# Patient Record
Sex: MALE | Race: OTHER | Employment: OTHER | URBAN - METROPOLITAN AREA
[De-identification: names, ages, dates, MRNs, and addresses within clinical notes are randomized per-mention and may not be internally consistent; named-entity substitution may affect disease eponyms.]

---

## 2021-07-12 ENCOUNTER — OFFICE VISIT (OUTPATIENT)
Dept: INTERNAL MEDICINE CLINIC | Facility: CLINIC | Age: 56
End: 2021-07-12
Payer: COMMERCIAL

## 2021-07-12 VITALS
SYSTOLIC BLOOD PRESSURE: 126 MMHG | DIASTOLIC BLOOD PRESSURE: 82 MMHG | BODY MASS INDEX: 26 KG/M2 | OXYGEN SATURATION: 97 % | HEART RATE: 62 BPM | WEIGHT: 182 LBS | RESPIRATION RATE: 16 BRPM

## 2021-07-12 DIAGNOSIS — R22.42 ANKLE MASS, LEFT: ICD-10-CM

## 2021-07-12 DIAGNOSIS — E11.9 CONTROLLED TYPE 2 DIABETES MELLITUS WITHOUT COMPLICATION, WITHOUT LONG-TERM CURRENT USE OF INSULIN (HCC): Primary | ICD-10-CM

## 2021-07-12 DIAGNOSIS — R79.89 ABNORMAL CBC: ICD-10-CM

## 2021-07-12 LAB
BASOPHILS # BLD AUTO: 0.05 X10(3) UL (ref 0–0.2)
BASOPHILS NFR BLD AUTO: 0.9 %
DEPRECATED RDW RBC AUTO: 41.6 FL (ref 35.1–46.3)
EOSINOPHIL # BLD AUTO: 0.72 X10(3) UL (ref 0–0.7)
EOSINOPHIL NFR BLD AUTO: 12.3 %
ERYTHROCYTE [DISTWIDTH] IN BLOOD BY AUTOMATED COUNT: 12.2 % (ref 11–15)
EST. AVERAGE GLUCOSE BLD GHB EST-MCNC: 148 MG/DL (ref 68–126)
HBA1C MFR BLD HPLC: 6.8 % (ref ?–5.7)
HCT VFR BLD AUTO: 45.7 %
HGB BLD-MCNC: 15.3 G/DL
IMM GRANULOCYTES # BLD AUTO: 0.04 X10(3) UL (ref 0–1)
IMM GRANULOCYTES NFR BLD: 0.7 %
LYMPHOCYTES # BLD AUTO: 1.47 X10(3) UL (ref 1–4)
LYMPHOCYTES NFR BLD AUTO: 25.1 %
MCH RBC QN AUTO: 31 PG (ref 26–34)
MCHC RBC AUTO-ENTMCNC: 33.5 G/DL (ref 31–37)
MCV RBC AUTO: 92.5 FL
MONOCYTES # BLD AUTO: 0.51 X10(3) UL (ref 0.1–1)
MONOCYTES NFR BLD AUTO: 8.7 %
NEUTROPHILS # BLD AUTO: 3.07 X10 (3) UL (ref 1.5–7.7)
NEUTROPHILS # BLD AUTO: 3.07 X10(3) UL (ref 1.5–7.7)
NEUTROPHILS NFR BLD AUTO: 52.3 %
PLATELET # BLD AUTO: 144 10(3)UL (ref 150–450)
RBC # BLD AUTO: 4.94 X10(6)UL
WBC # BLD AUTO: 5.9 X10(3) UL (ref 4–11)

## 2021-07-12 PROCEDURE — 99203 OFFICE O/P NEW LOW 30 MIN: CPT | Performed by: FAMILY MEDICINE

## 2021-07-12 PROCEDURE — 3074F SYST BP LT 130 MM HG: CPT | Performed by: FAMILY MEDICINE

## 2021-07-12 PROCEDURE — 83036 HEMOGLOBIN GLYCOSYLATED A1C: CPT | Performed by: FAMILY MEDICINE

## 2021-07-12 PROCEDURE — 3079F DIAST BP 80-89 MM HG: CPT | Performed by: FAMILY MEDICINE

## 2021-07-12 PROCEDURE — 85025 COMPLETE CBC W/AUTO DIFF WBC: CPT | Performed by: FAMILY MEDICINE

## 2021-07-12 RX ORDER — SIMVASTATIN 40 MG
40 TABLET ORAL EVERY EVENING
COMMUNITY
Start: 2021-06-16 | End: 2021-09-20

## 2021-07-12 NOTE — PROGRESS NOTES
PATIENT IDENTIFICATION  Name: Abiola Swanson  MRN: [id number]    Diagnoses:   Controlled type 2 diabetes mellitus without complication, without long-term current use of insulin ([facility]  (primary encounter diagnosis)  Abnormal CBC  Ankle mass, left    SUBJECTIVE Size: adult)   Pulse 62   Resp 16   Wt 182 lb (82.6 kg)   SpO2 97%   BMI 26.11 kg/m²   General Appearance: in no apparent distress and well developed and well nourished  HEENT: Normocephalic, atraumatic  Lungs: chest is clear without rales or wheezing  Hea

## 2021-07-23 ENCOUNTER — TELEPHONE (OUTPATIENT)
Dept: INTERNAL MEDICINE CLINIC | Facility: CLINIC | Age: 56
End: 2021-07-23

## 2021-07-23 DIAGNOSIS — D69.6 THROMBOCYTOPENIA (HCC): Primary | ICD-10-CM

## 2021-07-23 NOTE — TELEPHONE ENCOUNTER
Called and left message to call back. If patient calls back, please let him know his platelets were slightly low, this was also seen previously, I recommend he see a hematologist ( a blood doctor) to evaluate this further. Referral placed.      Diabetes

## 2021-08-23 ENCOUNTER — TELEPHONE (OUTPATIENT)
Dept: INTERNAL MEDICINE CLINIC | Facility: CLINIC | Age: 56
End: 2021-08-23

## 2021-08-25 DIAGNOSIS — R22.42 ANKLE MASS, LEFT: Primary | ICD-10-CM

## 2021-08-30 ENCOUNTER — TELEPHONE (OUTPATIENT)
Dept: INTERNAL MEDICINE CLINIC | Facility: CLINIC | Age: 56
End: 2021-08-30

## 2021-08-30 NOTE — TELEPHONE ENCOUNTER
Stormy Huynh DPM,  Podiatrist  [address] · [phone]    Only one with this phone number. Not in our network.

## 2021-08-30 NOTE — TELEPHONE ENCOUNTER
I cannot find a Dr. Sharon Ram that's a podiatrist with that number. Can RN help to see why referral is still pending?

## 2021-08-30 NOTE — TELEPHONE ENCOUNTER
So referral for  is still pending but patient called office and they stated they are not in network so patient would like a new referral for  located [facility] in [place] with phone number being [phone]. Please advise.

## 2021-09-20 RX ORDER — SIMVASTATIN 40 MG
40 TABLET ORAL EVERY EVENING
Qty: 90 TABLET | Refills: 0 | Status: SHIPPED | OUTPATIENT
Start: 2021-09-20

## 2021-09-20 RX ORDER — METFORMIN HYDROCHLORIDE 500 MG/1
500 TABLET, EXTENDED RELEASE ORAL
Qty: 90 TABLET | Refills: 0 | Status: SHIPPED | OUTPATIENT
Start: 2021-09-20

## 2021-10-12 ENCOUNTER — OFFICE VISIT (OUTPATIENT)
Dept: INTERNAL MEDICINE CLINIC | Facility: CLINIC | Age: 56
End: 2021-10-12
Payer: MEDICARE

## 2021-10-12 VITALS
RESPIRATION RATE: 15 BRPM | HEIGHT: 68 IN | HEART RATE: 75 BPM | BODY MASS INDEX: 26.67 KG/M2 | SYSTOLIC BLOOD PRESSURE: 108 MMHG | WEIGHT: 176 LBS | OXYGEN SATURATION: 99 % | DIASTOLIC BLOOD PRESSURE: 62 MMHG

## 2021-10-12 DIAGNOSIS — D69.6 THROMBOCYTOPENIA (HCC): ICD-10-CM

## 2021-10-12 DIAGNOSIS — Z23 NEED FOR INFLUENZA VACCINATION: ICD-10-CM

## 2021-10-12 DIAGNOSIS — R22.42 ANKLE MASS, LEFT: ICD-10-CM

## 2021-10-12 DIAGNOSIS — E11.9 CONTROLLED TYPE 2 DIABETES MELLITUS WITHOUT COMPLICATION, WITHOUT LONG-TERM CURRENT USE OF INSULIN (HCC): Primary | ICD-10-CM

## 2021-10-12 PROCEDURE — 90686 IIV4 VACC NO PRSV 0.5 ML IM: CPT | Performed by: FAMILY MEDICINE

## 2021-10-12 PROCEDURE — 99213 OFFICE O/P EST LOW 20 MIN: CPT | Performed by: FAMILY MEDICINE

## 2021-10-12 PROCEDURE — 3008F BODY MASS INDEX DOCD: CPT | Performed by: FAMILY MEDICINE

## 2021-10-12 PROCEDURE — G0008 ADMIN INFLUENZA VIRUS VAC: HCPCS | Performed by: FAMILY MEDICINE

## 2021-10-12 PROCEDURE — 3074F SYST BP LT 130 MM HG: CPT | Performed by: FAMILY MEDICINE

## 2021-10-12 PROCEDURE — 3078F DIAST BP <80 MM HG: CPT | Performed by: FAMILY MEDICINE

## 2021-10-12 PROCEDURE — 3044F HG A1C LEVEL LT 7.0%: CPT | Performed by: FAMILY MEDICINE

## 2021-10-15 NOTE — PROGRESS NOTES
PATIENT IDENTIFICATION  Name: Kimberly Brennan  MRN: [id number]    Diagnoses:   Controlled type 2 diabetes mellitus without complication, without long-term current use of insulin ([facility]  (primary encounter diagnosis)  Thrombocytopenia ([facility]  Need for influenza v perfused  Mood: No anxiety or depression noted      IMPRESSION/PLAN  Cleatis Portal presents to us today for f/u for diabetes, abnormal lab and needed referral.     1. Controlled type 2 diabetes mellitus without complication, without long-term current use of

## 2021-10-26 ENCOUNTER — TELEPHONE (OUTPATIENT)
Dept: INTERNAL MEDICINE CLINIC | Facility: CLINIC | Age: 56
End: 2021-10-26

## 2021-10-26 NOTE — TELEPHONE ENCOUNTER
Patient called   Needs a referral for this Doctor that is in his Network:    Dr. Umm Martinez  Address: [address] [facility] [address], [address] [place] [address], [address] [place] [address] Road Phone: [phone]

## 2021-10-27 NOTE — TELEPHONE ENCOUNTER
Pt will see Dr. Selma Blevins this Friday. Printed auth from insurance and original referral order.    Pt will  today to take to appointment.    -Dee Bermudez

## 2021-10-29 ENCOUNTER — OFFICE VISIT (OUTPATIENT)
Dept: PODIATRY CLINIC | Facility: CLINIC | Age: 56
End: 2021-10-29
Payer: MEDICARE

## 2021-10-29 ENCOUNTER — HOSPITAL ENCOUNTER (OUTPATIENT)
Dept: GENERAL RADIOLOGY | Age: 56
Discharge: HOME | End: 2021-10-29
Attending: PODIATRIST
Payer: MEDICARE

## 2021-10-29 DIAGNOSIS — M79.89 PALPABLE MASS OF SOFT TISSUE OF ANKLE: Primary | ICD-10-CM

## 2021-10-29 DIAGNOSIS — M79.89 PALPABLE MASS OF SOFT TISSUE OF ANKLE: ICD-10-CM

## 2021-10-29 PROCEDURE — 73610 X-RAY EXAM OF ANKLE: CPT | Performed by: PODIATRIST

## 2021-10-29 PROCEDURE — 99203 OFFICE O/P NEW LOW 30 MIN: CPT | Performed by: PODIATRIST

## 2021-10-29 NOTE — PROGRESS NOTES
Christophe Amezcua is a 61year old male. Patient presents with:  New Patient: Left foot lump, pt states is been itching denies any pain today. A1c was 6.9 10/12/21 pt did not check BS this Am.         HPI:   This pleasant gentleman presents to the clinic with a SYSTEMS:   Today reviewed systens as documented below  GENERAL HEALTH: feels well otherwise  SKIN: Refer to exam below  RESPIRATORY: denies shortness of breath with exertion  CARDIOVASCULAR: denies chest pain on exertion  GI: denies abdominal pain and hollie

## 2021-11-01 ENCOUNTER — TELEPHONE (OUTPATIENT)
Dept: ENDOCRINOLOGY CLINIC | Facility: CLINIC | Age: 56
End: 2021-11-01

## 2021-11-01 NOTE — TELEPHONE ENCOUNTER
Patient does not have a my chart account.    Please contact patient and notify that MRI approved to be done at [facility]   [address] [place], [address]  Scheduling [phone] , fax [phone], Gema Man from 11/1/2021

## 2021-11-03 NOTE — TELEPHONE ENCOUNTER
Called pt and he states he speaks very little english and I told him I could CB with  but he requested I s/w his son. Informed son of approval info, exp date and facility info to call and schedule.  Advised him to obtain a disc with MRI images an

## 2021-11-11 NOTE — TELEPHONE ENCOUNTER
Patient states he was trying to scheduled his MRI but they told him Dr. Selma Blevins has to call. States he is unsure to why since his MRI is authorized.  Phone [phone]

## 2021-11-11 NOTE — TELEPHONE ENCOUNTER
Spoke to patient, states attempted to schedule MRI but was informed by Bart Toscano that office has to call states he tried to provided Auth number but office continued requesting office to contact them.        Prior [address] from [address]

## 2021-11-12 ENCOUNTER — APPOINTMENT (OUTPATIENT)
Dept: HEMATOLOGY/ONCOLOGY | Facility: HOSPITAL | Age: 56
End: 2021-11-12
Attending: INTERNAL MEDICINE
Payer: MEDICARE

## 2021-11-12 VITALS
RESPIRATION RATE: 16 BRPM | OXYGEN SATURATION: 97 % | HEIGHT: 69 IN | WEIGHT: 178 LBS | DIASTOLIC BLOOD PRESSURE: 73 MMHG | TEMPERATURE: 98 F | BODY MASS INDEX: 26.36 KG/M2 | HEART RATE: 78 BPM | SYSTOLIC BLOOD PRESSURE: 123 MMHG

## 2021-11-12 DIAGNOSIS — R16.1 SPLEEN ENLARGED: ICD-10-CM

## 2021-11-12 DIAGNOSIS — D69.6 THROMBOCYTOPENIA (HCC): Primary | ICD-10-CM

## 2021-11-12 PROCEDURE — 99204 OFFICE O/P NEW MOD 45 MIN: CPT | Performed by: INTERNAL MEDICINE

## 2021-11-12 NOTE — TELEPHONE ENCOUNTER
[facility], [phone], spoke with Amaris Catalan. Informed need to change facility to [facility] at [address], [address]  Fax [phone], she placed me on hold to verify facility in network and has updated ca

## 2021-11-12 NOTE — CONSULTS
ELIAZAR June is a 61year old male here at the request of Jaymie Mancia MD for evaluation of Thrombocytopenia (hcc)  (primary encounter diagnosis)    Patient presents here today for evaluation of mild thrombocytopenia.   This was first noted March 6 per week    Drug use: No      Family History   Problem Relation Age of Onset   • Cancer Father 76        Renal    • Diabetes Mother          PHYSICAL EXAM:    /73 (BP Location: Left arm, Patient Position: Sitting, Cuff Size: adult)   Pulse 78   Temp encounter.       Specimen:  Blood - Venous  Component   Ref Range & Units 8 mo ago   WBC   3.6 - 10.2 10ˆ3/µL 6.0       RBC   (Based on documented legal sex) 4.40-6.00 10ˆ6/µL 5.00       HGB   (Based on documented legal sex) 13.2-18.0 g/dL 15.4       HCT 31.0 - 37.0 g/dL 33.5    RDW-SD   35.1 - 46.3 fL 41.6    RDW   11.0 - 15.0 % 12.2    PLT   150.0 - 450.0 10(3)uL 144.0 Low     Neutrophil Absolute Prelim   1.50 - 7.70 x10 (3) uL 3.07    Neutrophil Absolute   1.50 - 7.70 x10(3) uL 3.07    Lymphocyte Abso

## 2021-11-12 NOTE — PATIENT INSTRUCTIONS
Plaquetas  ¿Rick análisis tiene otros nombres? Recuento de plaquetas, recuento de trombocitos. ¿Qué es rick análisis? Rick análisis mide la cantidad de plaquetas que hay en bennett vale.   Las plaquetas son células con forma de disco que ayudan a que bennett s rick?  También es posible que bennett proveedor de atención médica le pida un hemograma completo (CBC, por fabrice siglas en inglés), que mide todas las células de [address]   Bennett proveedor de [employer] puede pedirle un volumen plaquetario medio (MPV, para extraer vale de harrison vena en bennett brazo o mano.   ¿Carmelina análisis implica algún riesgo? Cody harrison Elmo de vale con harrison aguja implica riesgos. Estos incluyen sangrado, infección, moretones o sensación de [place].  Cuando le pinchen el brazo o la [place] trombocitopenia  Las plaquetas y otras células sanguíneas se producen en la médula ósea. La médula ósea es la parte blanda y esponjosa del interior de [place].  La trombocitopenia puede presentarse si sucede lo siguiente:   · La médula ósea no produce bennett de células sanguíneas. Se extiende Cristal Cords de vale sobre un portamuestras de lizzeth y se examina con el microscopio. Se [place] un tinte para poder bryce las células. · Aspiración y biopsia de la [place].  Esta prueba sirve para detectar problemas en l trastorno es crónico, podría requerir tratamientos específicos para controlarlo. También podría ser necesario que tome ciertas precauciones todos los días para reducir el riesgo de sangrado.  Por ejemplo, walt vez le indiquen que limite las actividades que a a detener el sangrado (hemorragia). Cuando bennett cuerpo no tiene suficientes plaquetas, aumenta bennett riesgo de sangrado. Los siguientes son consejos de las medidas que usted puede jennifer en bennett casa para bajar bennett riesgo.   Medicamentos y atención médica  · Evite t piel.  · Si tiene problemas de sangrado de encías, use un cepillo de dientes de esponja (en vez de kaylyn de cerdas). Pregunte a bennett proveedor de [employer] o dentista dónde puede conseguir kaylyn.   Visitas de control  Programe harrison visita de control según le

## 2021-11-18 NOTE — TELEPHONE ENCOUNTER
Patient was notified with understanding. Phone number provided for scheduling. Order faxed and confirmation received.

## 2021-12-07 ENCOUNTER — HOSPITAL ENCOUNTER (OUTPATIENT)
Dept: ULTRASOUND IMAGING | Facility: HOSPITAL | Age: 56
Discharge: HOME | End: 2021-12-07
Attending: INTERNAL MEDICINE
Payer: MEDICARE

## 2021-12-07 DIAGNOSIS — R16.1 SPLEEN ENLARGED: ICD-10-CM

## 2021-12-07 PROCEDURE — 76705 ECHO EXAM OF ABDOMEN: CPT | Performed by: INTERNAL MEDICINE

## 2022-01-18 ENCOUNTER — OFFICE VISIT (OUTPATIENT)
Dept: INTERNAL MEDICINE CLINIC | Facility: CLINIC | Age: 57
End: 2022-01-18
Payer: MEDICARE

## 2022-01-18 VITALS
WEIGHT: 181 LBS | HEIGHT: 69 IN | BODY MASS INDEX: 26.81 KG/M2 | DIASTOLIC BLOOD PRESSURE: 68 MMHG | OXYGEN SATURATION: 99 % | SYSTOLIC BLOOD PRESSURE: 122 MMHG | HEART RATE: 67 BPM | RESPIRATION RATE: 16 BRPM

## 2022-01-18 DIAGNOSIS — E78.2 MIXED HYPERLIPIDEMIA: ICD-10-CM

## 2022-01-18 DIAGNOSIS — E11.9 CONTROLLED TYPE 2 DIABETES MELLITUS WITHOUT COMPLICATION, WITHOUT LONG-TERM CURRENT USE OF INSULIN (HCC): ICD-10-CM

## 2022-01-18 DIAGNOSIS — D69.6 THROMBOCYTOPENIA (HCC): ICD-10-CM

## 2022-01-18 DIAGNOSIS — M89.8X9 BONE PAIN: ICD-10-CM

## 2022-01-18 DIAGNOSIS — Z00.00 ENCOUNTER FOR ANNUAL HEALTH EXAMINATION: Primary | ICD-10-CM

## 2022-01-18 DIAGNOSIS — G89.29 HIP PAIN, CHRONIC, LEFT: ICD-10-CM

## 2022-01-18 DIAGNOSIS — M25.552 HIP PAIN, CHRONIC, LEFT: ICD-10-CM

## 2022-01-18 DIAGNOSIS — Z12.5 SCREENING FOR PROSTATE CANCER: ICD-10-CM

## 2022-01-18 PROCEDURE — 96160 PT-FOCUSED HLTH RISK ASSMT: CPT | Performed by: FAMILY MEDICINE

## 2022-01-18 PROCEDURE — 3008F BODY MASS INDEX DOCD: CPT | Performed by: FAMILY MEDICINE

## 2022-01-18 PROCEDURE — 3061F NEG MICROALBUMINURIA REV: CPT | Performed by: FAMILY MEDICINE

## 2022-01-18 PROCEDURE — 3074F SYST BP LT 130 MM HG: CPT | Performed by: FAMILY MEDICINE

## 2022-01-18 PROCEDURE — G0438 PPPS, INITIAL VISIT: HCPCS | Performed by: FAMILY MEDICINE

## 2022-01-18 PROCEDURE — 3052F HG A1C>EQUAL 8.0%<EQUAL 9.0%: CPT | Performed by: FAMILY MEDICINE

## 2022-01-18 PROCEDURE — 99396 PREV VISIT EST AGE 40-64: CPT | Performed by: FAMILY MEDICINE

## 2022-01-18 PROCEDURE — 3078F DIAST BP <80 MM HG: CPT | Performed by: FAMILY MEDICINE

## 2022-01-18 NOTE — PATIENT INSTRUCTIONS
Mau Elizabeth's SCREENING SCHEDULE   Tests on this list are recommended by your physician but may not be covered, or covered at this frequency, by your insurer. Please check with your insurance carrier before scheduling to verify coverage.    PREVENTATIVE Hepatitis B One screening covered for patients with certain risk factors   04/12/2019  No recommendations at this time    Tetanus Toxoid Not covered by Medicare Part B unless medically necessary (cut with metal); may be covered with your pharmacy prescr

## 2022-01-18 NOTE — PROGRESS NOTES
Subjective:   Claudius Cheadle is a 61year old male who presents for a MA (Medicare Advantage) Supervisit (Once per calendar year). For the last 6 months gets pain in his lateral hip that hurts up to   5/10, radiates to mid lateral thigh.    Feels like a \" History:  He  has a past surgical history that includes shoulder arthroscopy (3/27/13) and arthroscopy, shoulder, surgical; w/rotator cuff repair (Right, 11/19/2014).    Family History:  His family history includes Cancer (age of onset: 76) in his father; D apparent distress and well   developed and well nourished  HEENT: Normocephalic, atraumatic, normal TMs bilaterally,(posterior oropharynx not examined due to current pandemic and risk of COVID-19)   Lungs: chest is clear without rales or wheezing  Heart: S the plan. Reinforced healthy diet, lifestyle, and exercise. No follow-ups on file. Mayra Wang MD, 1/18/2022     Supplementary Documentation:   General Health:   In the past six months, have you lost more than 10 pounds without trying?: 2 - No  Dulce Venegas for Abdominal Aortic Aneurysm (AAA) Covered once in a lifetime for one of the following risk factors   • Men who are 73-68 years old and have ever smoked   • Anyone with a family history -     Colorectal Cancer Screening  Covered for ages 52-80; only need

## 2022-01-19 LAB
CREATININE, RANDOM URINE: 78 MG/DL (ref 20–320)
MICROALBUMIN/CREATININE RATIO, RANDOM URINE: 3 MCG/MG CREAT
MICROALBUMIN: 0.2 MG/DL

## 2022-01-20 ENCOUNTER — NURSE ONLY (OUTPATIENT)
Dept: INTERNAL MEDICINE CLINIC | Facility: CLINIC | Age: 57
End: 2022-01-20
Payer: MEDICARE

## 2022-01-21 LAB
HEMOGLOBIN A1C: 8.1 % OF TOTAL HGB
VITAMIN D, 25-OH, TOTAL: 19 NG/ML (ref 30–100)

## 2022-01-24 LAB
ALBUMIN/GLOBULIN RATIO: 1.7 (CALC) (ref 1–2.5)
ALBUMIN: 4.5 G/DL (ref 3.6–5.1)
ALKALINE PHOSPHATASE: 84 U/L (ref 35–144)
ALT: 109 U/L (ref 9–46)
AST: 153 U/L (ref 10–35)
BILIRUBIN, TOTAL: 0.9 MG/DL (ref 0.2–1.2)
BUN: 11 MG/DL (ref 7–25)
CALCIUM: 9.4 MG/DL (ref 8.6–10.3)
CARBON DIOXIDE: 23 MMOL/L (ref 20–32)
CHLORIDE: 102 MMOL/L (ref 98–110)
CHOL/HDLC RATIO: 4.6 (CALC)
CHOLESTEROL, TOTAL: 188 MG/DL
CREATININE: 0.89 MG/DL (ref 0.7–1.33)
EGFR IF AFRICN AM: 110 ML/MIN/1.73M2
EGFR IF NONAFRICN AM: 95 ML/MIN/1.73M2
GLOBULIN: 2.6 G/DL (CALC) (ref 1.9–3.7)
GLUCOSE: 157 MG/DL (ref 65–99)
HDL CHOLESTEROL: 41 MG/DL
LDL-CHOLESTEROL: 113 MG/DL (CALC)
NON-HDL CHOLESTEROL: 147 MG/DL (CALC)
POTASSIUM: 4.3 MMOL/L (ref 3.5–5.3)
PROTEIN, TOTAL: 7.1 G/DL (ref 6.1–8.1)
SODIUM: 136 MMOL/L (ref 135–146)
TRIGLYCERIDES: 223 MG/DL

## 2022-02-09 ENCOUNTER — OFFICE VISIT (OUTPATIENT)
Dept: INTERNAL MEDICINE CLINIC | Facility: CLINIC | Age: 57
End: 2022-02-09
Payer: MEDICARE

## 2022-02-09 VITALS
OXYGEN SATURATION: 98 % | HEIGHT: 69 IN | DIASTOLIC BLOOD PRESSURE: 78 MMHG | WEIGHT: 186 LBS | HEART RATE: 84 BPM | SYSTOLIC BLOOD PRESSURE: 124 MMHG | BODY MASS INDEX: 27.55 KG/M2

## 2022-02-09 DIAGNOSIS — R79.89 ELEVATED LFTS: Primary | ICD-10-CM

## 2022-02-09 DIAGNOSIS — E11.65 CONTROLLED TYPE 2 DIABETES MELLITUS WITH HYPERGLYCEMIA, WITHOUT LONG-TERM CURRENT USE OF INSULIN (HCC): ICD-10-CM

## 2022-02-09 PROCEDURE — 36415 COLL VENOUS BLD VENIPUNCTURE: CPT | Performed by: FAMILY MEDICINE

## 2022-02-09 PROCEDURE — 99214 OFFICE O/P EST MOD 30 MIN: CPT | Performed by: FAMILY MEDICINE

## 2022-02-09 PROCEDURE — 3078F DIAST BP <80 MM HG: CPT | Performed by: FAMILY MEDICINE

## 2022-02-09 PROCEDURE — 3008F BODY MASS INDEX DOCD: CPT | Performed by: FAMILY MEDICINE

## 2022-02-09 PROCEDURE — 3074F SYST BP LT 130 MM HG: CPT | Performed by: FAMILY MEDICINE

## 2022-02-10 LAB
% SATURATION: 31 % (CALC) (ref 20–48)
IRON BINDING CAPACITY: 288 MCG/DL (CALC) (ref 250–425)
IRON, TOTAL: 89 MCG/DL (ref 50–180)
SIGNAL TO CUT-OFF: 0.02
TSH W/REFLEX TO FT4: 4.12 MIU/L (ref 0.4–4.5)

## 2022-02-15 RX ORDER — SIMVASTATIN 40 MG
40 TABLET ORAL EVERY EVENING
Qty: 90 TABLET | Refills: 0 | Status: SHIPPED | OUTPATIENT
Start: 2022-02-15

## 2022-03-04 ENCOUNTER — TELEPHONE (OUTPATIENT)
Dept: INTERNAL MEDICINE CLINIC | Facility: CLINIC | Age: 57
End: 2022-03-04

## 2022-03-04 NOTE — TELEPHONE ENCOUNTER
Referral pending since late January. Patient neds to wait for us to give him the OK to schedule. Asking PSR to please call him with that info.

## 2022-03-04 NOTE — TELEPHONE ENCOUNTER
Pt called and went to see Dr. Krishna Simpler - eye  and they advised pt that he needed a referral.  Please call pt.

## 2022-03-04 NOTE — TELEPHONE ENCOUNTER
Referral has been approved. Must see eye doctor no later than 06-. Referral was updated. Thank you! Asking Vietnamese Speaking staff to call and tell patient. RN will fax referral to speciality office.

## 2022-04-11 ENCOUNTER — LAB ENCOUNTER (OUTPATIENT)
Dept: LAB | Facility: HOSPITAL | Age: 57
End: 2022-04-11
Attending: INTERNAL MEDICINE
Payer: MEDICARE

## 2022-04-11 ENCOUNTER — OFFICE VISIT (OUTPATIENT)
Dept: SURGERY | Facility: CLINIC | Age: 57
End: 2022-04-11
Payer: MEDICARE

## 2022-04-11 VITALS
HEART RATE: 89 BPM | TEMPERATURE: 96 F | WEIGHT: 180.81 LBS | OXYGEN SATURATION: 95 % | SYSTOLIC BLOOD PRESSURE: 115 MMHG | BODY MASS INDEX: 27 KG/M2 | DIASTOLIC BLOOD PRESSURE: 69 MMHG

## 2022-04-11 DIAGNOSIS — R79.89 ELEVATED LIVER FUNCTION TESTS: Primary | ICD-10-CM

## 2022-04-11 DIAGNOSIS — R79.89 ELEVATED LIVER FUNCTION TESTS: ICD-10-CM

## 2022-04-11 LAB
ALBUMIN SERPL-MCNC: 4.1 G/DL (ref 3.4–5)
ALP LIVER SERPL-CCNC: 72 U/L
ALT SERPL-CCNC: 69 U/L
AST SERPL-CCNC: 49 U/L (ref 15–37)
BILIRUB DIRECT SERPL-MCNC: 0.2 MG/DL (ref 0–0.2)
BILIRUB SERPL-MCNC: 0.6 MG/DL (ref 0.1–2)
CERULOPLASMIN SERPL-MCNC: 22.3 MG/DL (ref 20–60)
CK SERPL-CCNC: 85 U/L
HAV AB SER QL IA: REACTIVE
HBV SURFACE AB SER QL: REACTIVE
HBV SURFACE AB SERPL IA-ACNC: >1000 MIU/ML
IMMUNOGLOBULIN PNL SER-MCNC: 990 MG/DL (ref 791–1643)
PROT SERPL-MCNC: 7.1 G/DL (ref 6.4–8.2)

## 2022-04-11 PROCEDURE — 86706 HEP B SURFACE ANTIBODY: CPT | Performed by: INTERNAL MEDICINE

## 2022-04-11 PROCEDURE — 86709 HEPATITIS A IGM ANTIBODY: CPT | Performed by: INTERNAL MEDICINE

## 2022-04-11 PROCEDURE — 36415 COLL VENOUS BLD VENIPUNCTURE: CPT | Performed by: INTERNAL MEDICINE

## 2022-04-11 PROCEDURE — 86708 HEPATITIS A ANTIBODY: CPT | Performed by: INTERNAL MEDICINE

## 2022-04-11 PROCEDURE — 80076 HEPATIC FUNCTION PANEL: CPT | Performed by: INTERNAL MEDICINE

## 2022-04-11 PROCEDURE — 82550 ASSAY OF CK (CPK): CPT | Performed by: INTERNAL MEDICINE

## 2022-04-11 PROCEDURE — 86381 MITOCHONDRIAL ANTIBODY EACH: CPT | Performed by: INTERNAL MEDICINE

## 2022-04-11 PROCEDURE — 82390 ASSAY OF CERULOPLASMIN: CPT | Performed by: INTERNAL MEDICINE

## 2022-04-11 PROCEDURE — 86038 ANTINUCLEAR ANTIBODIES: CPT

## 2022-04-11 PROCEDURE — 82784 ASSAY IGA/IGD/IGG/IGM EACH: CPT | Performed by: INTERNAL MEDICINE

## 2022-04-11 PROCEDURE — 86256 FLUORESCENT ANTIBODY TITER: CPT | Performed by: INTERNAL MEDICINE

## 2022-04-12 LAB
HAV IGM SER QL: NONREACTIVE
MITOCHONDRIA AB TITR SER: <20 {TITER}
SMOOTH MUSCLE AB TITR SER: <20 {TITER}

## 2022-04-14 LAB — NUCLEAR IGG TITR SER IF: NEGATIVE {TITER}

## 2022-04-20 RX ORDER — SIMVASTATIN 40 MG
40 TABLET ORAL EVERY EVENING
Qty: 90 TABLET | Refills: 2 | Status: SHIPPED | OUTPATIENT
Start: 2022-04-20

## 2022-04-20 NOTE — TELEPHONE ENCOUNTER
Given refill. Please call patient and advise he is due for repeat fasting lipid panel to ensure this medication has him at his goal and also a1c repeat. Thanks.    Seamus Armas MD

## 2022-05-18 ENCOUNTER — LAB ENCOUNTER (OUTPATIENT)
Dept: LAB | Facility: HOSPITAL | Age: 57
End: 2022-05-18
Attending: INTERNAL MEDICINE
Payer: MEDICARE

## 2022-05-18 DIAGNOSIS — D69.6 THROMBOCYTOPENIA (HCC): ICD-10-CM

## 2022-05-18 LAB
BASOPHILS # BLD AUTO: 0.05 X10(3) UL (ref 0–0.2)
BASOPHILS NFR BLD AUTO: 0.7 %
DEPRECATED RDW RBC AUTO: 39.6 FL (ref 35.1–46.3)
EOSINOPHIL # BLD AUTO: 0.97 X10(3) UL (ref 0–0.7)
EOSINOPHIL NFR BLD AUTO: 13.4 %
ERYTHROCYTE [DISTWIDTH] IN BLOOD BY AUTOMATED COUNT: 11.9 % (ref 11–15)
HCT VFR BLD AUTO: 47.8 %
HGB BLD-MCNC: 16.2 G/DL
IMM GRANULOCYTES # BLD AUTO: 0.02 X10(3) UL (ref 0–1)
IMM GRANULOCYTES NFR BLD: 0.3 %
LYMPHOCYTES # BLD AUTO: 2.14 X10(3) UL (ref 1–4)
LYMPHOCYTES NFR BLD AUTO: 29.5 %
MCH RBC QN AUTO: 30.5 PG (ref 26–34)
MCHC RBC AUTO-ENTMCNC: 33.9 G/DL (ref 31–37)
MCV RBC AUTO: 90 FL
MONOCYTES # BLD AUTO: 0.66 X10(3) UL (ref 0.1–1)
MONOCYTES NFR BLD AUTO: 9.1 %
NEUTROPHILS # BLD AUTO: 3.42 X10 (3) UL (ref 1.5–7.7)
NEUTROPHILS # BLD AUTO: 3.42 X10(3) UL (ref 1.5–7.7)
NEUTROPHILS NFR BLD AUTO: 47 %
PLATELET # BLD AUTO: 159 10(3)UL (ref 150–450)
RBC # BLD AUTO: 5.31 X10(6)UL
WBC # BLD AUTO: 7.3 X10(3) UL (ref 4–11)

## 2022-05-18 PROCEDURE — 85025 COMPLETE CBC W/AUTO DIFF WBC: CPT

## 2022-05-18 PROCEDURE — 36415 COLL VENOUS BLD VENIPUNCTURE: CPT

## 2022-05-19 ENCOUNTER — OFFICE VISIT (OUTPATIENT)
Dept: HEMATOLOGY/ONCOLOGY | Facility: HOSPITAL | Age: 57
End: 2022-05-19
Attending: INTERNAL MEDICINE
Payer: MEDICARE

## 2022-05-19 VITALS
BODY MASS INDEX: 26.07 KG/M2 | HEART RATE: 68 BPM | DIASTOLIC BLOOD PRESSURE: 80 MMHG | TEMPERATURE: 98 F | OXYGEN SATURATION: 96 % | WEIGHT: 176 LBS | RESPIRATION RATE: 16 BRPM | SYSTOLIC BLOOD PRESSURE: 117 MMHG | HEIGHT: 69 IN

## 2022-05-19 DIAGNOSIS — D69.6 THROMBOCYTOPENIA (HCC): Primary | ICD-10-CM

## 2022-05-19 PROCEDURE — 99213 OFFICE O/P EST LOW 20 MIN: CPT | Performed by: INTERNAL MEDICINE

## 2022-06-13 ENCOUNTER — LAB ENCOUNTER (OUTPATIENT)
Dept: LAB | Facility: HOSPITAL | Age: 57
End: 2022-06-13
Attending: INTERNAL MEDICINE
Payer: MEDICARE

## 2022-06-13 ENCOUNTER — OFFICE VISIT (OUTPATIENT)
Dept: SURGERY | Facility: CLINIC | Age: 57
End: 2022-06-13
Payer: MEDICARE

## 2022-06-13 VITALS
BODY MASS INDEX: 26 KG/M2 | SYSTOLIC BLOOD PRESSURE: 102 MMHG | DIASTOLIC BLOOD PRESSURE: 64 MMHG | OXYGEN SATURATION: 95 % | TEMPERATURE: 98 F | HEART RATE: 74 BPM | WEIGHT: 179.19 LBS

## 2022-06-13 DIAGNOSIS — R79.89 ELEVATED LIVER FUNCTION TESTS: Primary | ICD-10-CM

## 2022-06-13 DIAGNOSIS — R79.89 ELEVATED LIVER FUNCTION TESTS: ICD-10-CM

## 2022-06-13 LAB
ALBUMIN SERPL-MCNC: 3.9 G/DL (ref 3.4–5)
ALP LIVER SERPL-CCNC: 64 U/L
ALT SERPL-CCNC: 56 U/L
AST SERPL-CCNC: 48 U/L (ref 15–37)
BILIRUB DIRECT SERPL-MCNC: <0.1 MG/DL (ref 0–0.2)
BILIRUB SERPL-MCNC: 0.5 MG/DL (ref 0.1–2)
PROT SERPL-MCNC: 7.1 G/DL (ref 6.4–8.2)

## 2022-06-13 PROCEDURE — 80076 HEPATIC FUNCTION PANEL: CPT | Performed by: INTERNAL MEDICINE

## 2022-06-13 PROCEDURE — 36415 COLL VENOUS BLD VENIPUNCTURE: CPT

## 2022-06-13 PROCEDURE — 80321 ALCOHOLS BIOMARKERS 1OR 2: CPT

## 2022-06-16 LAB
PETH 16:0/18:1 (POPETH): 391 NG/ML
PETH 16:0/18:2 (PLPETH): 281 NG/ML

## 2022-07-18 ENCOUNTER — HOSPITAL ENCOUNTER (OUTPATIENT)
Dept: ULTRASOUND IMAGING | Facility: HOSPITAL | Age: 57
Discharge: HOME | End: 2022-07-18
Attending: INTERNAL MEDICINE
Payer: MEDICARE

## 2022-07-18 DIAGNOSIS — R79.89 ELEVATED LIVER FUNCTION TESTS: ICD-10-CM

## 2022-07-18 PROCEDURE — 76705 ECHO EXAM OF ABDOMEN: CPT | Performed by: INTERNAL MEDICINE

## 2022-07-18 PROCEDURE — 76981 USE PARENCHYMA: CPT | Performed by: INTERNAL MEDICINE

## 2022-08-30 ENCOUNTER — OFFICE VISIT (OUTPATIENT)
Dept: FAMILY MEDICINE CLINIC | Facility: CLINIC | Age: 57
End: 2022-08-30
Payer: MEDICARE

## 2022-08-30 VITALS
HEIGHT: 69 IN | BODY MASS INDEX: 26.36 KG/M2 | SYSTOLIC BLOOD PRESSURE: 137 MMHG | DIASTOLIC BLOOD PRESSURE: 85 MMHG | TEMPERATURE: 98 F | WEIGHT: 178 LBS

## 2022-08-30 DIAGNOSIS — R79.89 ELEVATED LFTS: ICD-10-CM

## 2022-08-30 DIAGNOSIS — E78.00 HYPERCHOLESTEROLEMIA: ICD-10-CM

## 2022-08-30 DIAGNOSIS — E11.65 TYPE 2 DIABETES MELLITUS WITH HYPERGLYCEMIA, WITHOUT LONG-TERM CURRENT USE OF INSULIN (HCC): Primary | ICD-10-CM

## 2022-08-30 DIAGNOSIS — D69.6 THROMBOCYTOPENIA (HCC): ICD-10-CM

## 2022-08-30 LAB
CARTRIDGE LOT#: ABNORMAL NUMERIC
HEMOGLOBIN A1C: 6.2 % (ref 4.3–5.6)

## 2022-08-30 PROCEDURE — 99203 OFFICE O/P NEW LOW 30 MIN: CPT | Performed by: FAMILY MEDICINE

## 2022-08-30 PROCEDURE — 3079F DIAST BP 80-89 MM HG: CPT | Performed by: FAMILY MEDICINE

## 2022-08-30 PROCEDURE — 3008F BODY MASS INDEX DOCD: CPT | Performed by: FAMILY MEDICINE

## 2022-08-30 PROCEDURE — 3044F HG A1C LEVEL LT 7.0%: CPT | Performed by: FAMILY MEDICINE

## 2022-08-30 PROCEDURE — 3075F SYST BP GE 130 - 139MM HG: CPT | Performed by: FAMILY MEDICINE

## 2022-08-30 PROCEDURE — 83036 HEMOGLOBIN GLYCOSYLATED A1C: CPT | Performed by: FAMILY MEDICINE

## 2022-10-10 ENCOUNTER — TELEPHONE (OUTPATIENT)
Dept: FAMILY MEDICINE CLINIC | Facility: CLINIC | Age: 57
End: 2022-10-10

## 2022-10-10 DIAGNOSIS — E11.65 TYPE 2 DIABETES MELLITUS WITH HYPERGLYCEMIA, WITHOUT LONG-TERM CURRENT USE OF INSULIN (HCC): Primary | ICD-10-CM

## 2022-10-10 NOTE — TELEPHONE ENCOUNTER
Attempted to reach patient via [employer]   ID# [id number] for medication adherence consult. Unable to reach patient; LVM for patient to call me back.

## 2022-10-11 NOTE — TELEPHONE ENCOUNTER
Spoke to patient via [address] [place] [address]  ID# [id number] for medication adherence consult. Patient overdue for refill on sitagliptin per insurance report. Patient reports that he has been using the sitagliptin more on a PRN basis because the medication is too costly for him. Patient reports that if his sugar is elevated in the morning, he will take the medication but otherwise will skip the medication. Provided education on the importance of consistency with medication and offered to reach out to PCP for alternative therapy. Patient would like for me to notify his PCP and check if he could be on a more affordable medication. Will send message to PCP to recommend alternative medication. Patient denies any additional questions or concerns at this time.

## 2022-10-13 RX ORDER — PIOGLITAZONEHYDROCHLORIDE 30 MG/1
30 TABLET ORAL DAILY
Qty: 90 TABLET | Refills: 1 | Status: SHIPPED | OUTPATIENT
Start: 2022-10-13

## 2022-11-08 ENCOUNTER — TELEPHONE (OUTPATIENT)
Dept: SURGERY | Facility: HOSPITAL | Age: 57
End: 2022-11-08

## 2022-11-08 ENCOUNTER — LAB ENCOUNTER (OUTPATIENT)
Dept: LAB | Facility: HOSPITAL | Age: 57
End: 2022-11-08
Attending: INTERNAL MEDICINE
Payer: MEDICARE

## 2022-11-08 DIAGNOSIS — R79.89 ABNORMAL LIVER FUNCTION TESTS: ICD-10-CM

## 2022-11-08 DIAGNOSIS — R79.89 ABNORMAL LIVER FUNCTION TESTS: Primary | ICD-10-CM

## 2022-11-08 LAB
ALBUMIN SERPL-MCNC: 4.1 G/DL (ref 3.4–5)
ALP LIVER SERPL-CCNC: 85 U/L
ALT SERPL-CCNC: 78 U/L
AST SERPL-CCNC: 56 U/L (ref 15–37)
BILIRUB DIRECT SERPL-MCNC: 0.1 MG/DL (ref 0–0.2)
BILIRUB SERPL-MCNC: 0.6 MG/DL (ref 0.1–2)
PROT SERPL-MCNC: 7.6 G/DL (ref 6.4–8.2)

## 2022-11-08 PROCEDURE — 80321 ALCOHOLS BIOMARKERS 1OR 2: CPT

## 2022-11-08 PROCEDURE — 36415 COLL VENOUS BLD VENIPUNCTURE: CPT

## 2022-11-08 PROCEDURE — 80076 HEPATIC FUNCTION PANEL: CPT

## 2022-11-10 LAB
PETH 16:0/18:1 (POPETH): 618 NG/ML
PETH 16:0/18:2 (PLPETH): 569 NG/ML

## 2022-11-18 ENCOUNTER — TELEPHONE (OUTPATIENT)
Dept: HEMATOLOGY/ONCOLOGY | Facility: HOSPITAL | Age: 57
End: 2022-11-18

## 2022-11-18 DIAGNOSIS — D69.6 THROMBOCYTOPENIA (HCC): Primary | ICD-10-CM

## 2022-11-18 NOTE — TELEPHONE ENCOUNTER
Patient called using language Estonian [facility] #[id number]). Reminded has appointment scheduled with Dr. Selin Barrios on Monday at 11 am and reminded patient to please have labs drawn prior to MD appointment. Patient verbalizes understanding.

## 2022-11-21 ENCOUNTER — OFFICE VISIT (OUTPATIENT)
Dept: HEMATOLOGY/ONCOLOGY | Facility: HOSPITAL | Age: 57
End: 2022-11-21
Attending: INTERNAL MEDICINE
Payer: MEDICARE

## 2022-11-21 ENCOUNTER — LAB ENCOUNTER (OUTPATIENT)
Dept: LAB | Facility: HOSPITAL | Age: 57
End: 2022-11-21
Attending: INTERNAL MEDICINE
Payer: MEDICARE

## 2022-11-21 VITALS
BODY MASS INDEX: 25.86 KG/M2 | RESPIRATION RATE: 16 BRPM | TEMPERATURE: 97 F | SYSTOLIC BLOOD PRESSURE: 121 MMHG | HEIGHT: 69 IN | HEART RATE: 71 BPM | OXYGEN SATURATION: 97 % | WEIGHT: 174.63 LBS | DIASTOLIC BLOOD PRESSURE: 71 MMHG

## 2022-11-21 DIAGNOSIS — D69.6 THROMBOCYTOPENIA (HCC): ICD-10-CM

## 2022-11-21 DIAGNOSIS — D69.6 THROMBOCYTOPENIA (HCC): Primary | ICD-10-CM

## 2022-11-21 LAB
BASOPHILS # BLD AUTO: 0.04 X10(3) UL (ref 0–0.2)
BASOPHILS NFR BLD AUTO: 0.6 %
DEPRECATED RDW RBC AUTO: 42.5 FL (ref 35.1–46.3)
EOSINOPHIL # BLD AUTO: 0.69 X10(3) UL (ref 0–0.7)
EOSINOPHIL NFR BLD AUTO: 11.1 %
ERYTHROCYTE [DISTWIDTH] IN BLOOD BY AUTOMATED COUNT: 12.2 % (ref 11–15)
HCT VFR BLD AUTO: 48.6 %
HGB BLD-MCNC: 16.1 G/DL
IMM GRANULOCYTES # BLD AUTO: 0.01 X10(3) UL (ref 0–1)
IMM GRANULOCYTES NFR BLD: 0.2 %
LYMPHOCYTES # BLD AUTO: 2.01 X10(3) UL (ref 1–4)
LYMPHOCYTES NFR BLD AUTO: 32.4 %
MCH RBC QN AUTO: 30.7 PG (ref 26–34)
MCHC RBC AUTO-ENTMCNC: 33.1 G/DL (ref 31–37)
MCV RBC AUTO: 92.6 FL
MONOCYTES # BLD AUTO: 0.47 X10(3) UL (ref 0.1–1)
MONOCYTES NFR BLD AUTO: 7.6 %
NEUTROPHILS # BLD AUTO: 2.98 X10 (3) UL (ref 1.5–7.7)
NEUTROPHILS # BLD AUTO: 2.98 X10(3) UL (ref 1.5–7.7)
NEUTROPHILS NFR BLD AUTO: 48.1 %
PLATELET # BLD AUTO: 166 10(3)UL (ref 150–450)
RBC # BLD AUTO: 5.25 X10(6)UL
WBC # BLD AUTO: 6.2 X10(3) UL (ref 4–11)

## 2022-11-21 PROCEDURE — 85025 COMPLETE CBC W/AUTO DIFF WBC: CPT

## 2022-11-21 PROCEDURE — 36415 COLL VENOUS BLD VENIPUNCTURE: CPT

## 2022-11-21 PROCEDURE — 99213 OFFICE O/P EST LOW 20 MIN: CPT | Performed by: INTERNAL MEDICINE

## 2023-02-14 ENCOUNTER — OFFICE VISIT (OUTPATIENT)
Dept: FAMILY MEDICINE CLINIC | Facility: CLINIC | Age: 58
End: 2023-02-14

## 2023-02-14 ENCOUNTER — LAB ENCOUNTER (OUTPATIENT)
Dept: LAB | Age: 58
End: 2023-02-14
Attending: FAMILY MEDICINE
Payer: MEDICARE

## 2023-02-14 VITALS
TEMPERATURE: 97 F | HEART RATE: 94 BPM | SYSTOLIC BLOOD PRESSURE: 116 MMHG | WEIGHT: 178 LBS | BODY MASS INDEX: 26 KG/M2 | DIASTOLIC BLOOD PRESSURE: 74 MMHG

## 2023-02-14 DIAGNOSIS — E55.9 VITAMIN D DEFICIENCY: ICD-10-CM

## 2023-02-14 DIAGNOSIS — E78.00 HYPERCHOLESTEROLEMIA: ICD-10-CM

## 2023-02-14 DIAGNOSIS — Z23 NEED FOR PNEUMOCOCCAL VACCINE: ICD-10-CM

## 2023-02-14 DIAGNOSIS — E11.65 TYPE 2 DIABETES MELLITUS WITH HYPERGLYCEMIA, WITHOUT LONG-TERM CURRENT USE OF INSULIN (HCC): ICD-10-CM

## 2023-02-14 DIAGNOSIS — D69.6 THROMBOCYTOPENIA (HCC): ICD-10-CM

## 2023-02-14 DIAGNOSIS — Z00.00 ENCOUNTER FOR ANNUAL HEALTH EXAMINATION: Primary | ICD-10-CM

## 2023-02-14 DIAGNOSIS — R79.89 ELEVATED LFTS: ICD-10-CM

## 2023-02-14 DIAGNOSIS — Z23 NEED FOR SHINGLES VACCINE: ICD-10-CM

## 2023-02-14 LAB
ALBUMIN SERPL-MCNC: 4.1 G/DL (ref 3.4–5)
ALBUMIN/GLOB SERPL: 1.1 {RATIO} (ref 1–2)
ALP LIVER SERPL-CCNC: 67 U/L
ALT SERPL-CCNC: 89 U/L
ANION GAP SERPL CALC-SCNC: 9 MMOL/L (ref 0–18)
AST SERPL-CCNC: 60 U/L (ref 15–37)
BASOPHILS # BLD AUTO: 0.06 X10(3) UL (ref 0–0.2)
BASOPHILS NFR BLD AUTO: 0.7 %
BILIRUB SERPL-MCNC: 0.5 MG/DL (ref 0.1–2)
BUN BLD-MCNC: 15 MG/DL (ref 7–18)
BUN/CREAT SERPL: 14.2 (ref 10–20)
CALCIUM BLD-MCNC: 9.9 MG/DL (ref 8.5–10.1)
CHLORIDE SERPL-SCNC: 103 MMOL/L (ref 98–112)
CHOLEST SERPL-MCNC: 168 MG/DL (ref ?–200)
CO2 SERPL-SCNC: 29 MMOL/L (ref 21–32)
CREAT BLD-MCNC: 1.06 MG/DL
CREAT UR-SCNC: 386 MG/DL
DEPRECATED RDW RBC AUTO: 39.9 FL (ref 35.1–46.3)
EOSINOPHIL # BLD AUTO: 0.57 X10(3) UL (ref 0–0.7)
EOSINOPHIL NFR BLD AUTO: 6.6 %
ERYTHROCYTE [DISTWIDTH] IN BLOOD BY AUTOMATED COUNT: 12 % (ref 11–15)
EST. AVERAGE GLUCOSE BLD GHB EST-MCNC: 160 MG/DL (ref 68–126)
FASTING PATIENT LIPID ANSWER: NO
FASTING STATUS PATIENT QL REPORTED: NO
GFR SERPLBLD BASED ON 1.73 SQ M-ARVRAT: 81 ML/MIN/1.73M2 (ref 60–?)
GLOBULIN PLAS-MCNC: 3.6 G/DL (ref 2.8–4.4)
GLUCOSE BLD-MCNC: 122 MG/DL (ref 70–99)
HBA1C MFR BLD: 7.2 % (ref ?–5.7)
HCT VFR BLD AUTO: 46.4 %
HDLC SERPL-MCNC: 41 MG/DL (ref 40–59)
HGB BLD-MCNC: 15.8 G/DL
IMM GRANULOCYTES # BLD AUTO: 0.03 X10(3) UL (ref 0–1)
IMM GRANULOCYTES NFR BLD: 0.3 %
LDLC SERPL CALC-MCNC: 74 MG/DL (ref ?–100)
LYMPHOCYTES # BLD AUTO: 1.78 X10(3) UL (ref 1–4)
LYMPHOCYTES NFR BLD AUTO: 20.5 %
MCH RBC QN AUTO: 30.8 PG (ref 26–34)
MCHC RBC AUTO-ENTMCNC: 34.1 G/DL (ref 31–37)
MCV RBC AUTO: 90.4 FL
MICROALBUMIN UR-MCNC: 1.94 MG/DL
MICROALBUMIN/CREAT 24H UR-RTO: 5 UG/MG (ref ?–30)
MONOCYTES # BLD AUTO: 0.74 X10(3) UL (ref 0.1–1)
MONOCYTES NFR BLD AUTO: 8.5 %
NEUTROPHILS # BLD AUTO: 5.49 X10 (3) UL (ref 1.5–7.7)
NEUTROPHILS # BLD AUTO: 5.49 X10(3) UL (ref 1.5–7.7)
NEUTROPHILS NFR BLD AUTO: 63.4 %
NONHDLC SERPL-MCNC: 127 MG/DL (ref ?–130)
OSMOLALITY SERPL CALC.SUM OF ELEC: 294 MOSM/KG (ref 275–295)
PLATELET # BLD AUTO: 136 10(3)UL (ref 150–450)
POTASSIUM SERPL-SCNC: 4.2 MMOL/L (ref 3.5–5.1)
PROT SERPL-MCNC: 7.7 G/DL (ref 6.4–8.2)
RBC # BLD AUTO: 5.13 X10(6)UL
SODIUM SERPL-SCNC: 141 MMOL/L (ref 136–145)
TRIGL SERPL-MCNC: 333 MG/DL (ref 30–149)
TSI SER-ACNC: 2.72 MIU/ML (ref 0.36–3.74)
VLDLC SERPL CALC-MCNC: 52 MG/DL (ref 0–30)
WBC # BLD AUTO: 8.7 X10(3) UL (ref 4–11)

## 2023-02-14 PROCEDURE — 36415 COLL VENOUS BLD VENIPUNCTURE: CPT | Performed by: FAMILY MEDICINE

## 2023-02-14 PROCEDURE — 80061 LIPID PANEL: CPT | Performed by: FAMILY MEDICINE

## 2023-02-14 PROCEDURE — 84443 ASSAY THYROID STIM HORMONE: CPT | Performed by: FAMILY MEDICINE

## 2023-02-14 PROCEDURE — 85025 COMPLETE CBC W/AUTO DIFF WBC: CPT | Performed by: FAMILY MEDICINE

## 2023-02-14 PROCEDURE — 82043 UR ALBUMIN QUANTITATIVE: CPT | Performed by: FAMILY MEDICINE

## 2023-02-14 PROCEDURE — 83036 HEMOGLOBIN GLYCOSYLATED A1C: CPT | Performed by: FAMILY MEDICINE

## 2023-02-14 PROCEDURE — 82570 ASSAY OF URINE CREATININE: CPT | Performed by: FAMILY MEDICINE

## 2023-02-14 PROCEDURE — 80053 COMPREHEN METABOLIC PANEL: CPT | Performed by: FAMILY MEDICINE

## 2023-02-14 RX ORDER — ZOSTER VACCINE RECOMBINANT, ADJUVANTED 50 MCG/0.5
0.5 KIT INTRAMUSCULAR ONCE
Qty: 1 EACH | Refills: 0 | Status: SHIPPED | OUTPATIENT
Start: 2023-02-14 | End: 2023-02-14

## 2023-08-31 ENCOUNTER — TELEPHONE (OUTPATIENT)
Dept: CASE MANAGEMENT | Age: 58
End: 2023-08-31

## 2023-08-31 DIAGNOSIS — E11.65 TYPE 2 DIABETES MELLITUS WITH HYPERGLYCEMIA, UNSPECIFIED WHETHER LONG TERM INSULIN USE (HCC): Primary | ICD-10-CM

## 2023-08-31 DIAGNOSIS — Z01.00 ENCOUNTER FOR COMPLETE EYE EXAM: ICD-10-CM

## 2024-02-19 ENCOUNTER — OFFICE VISIT (OUTPATIENT)
Dept: FAMILY MEDICINE CLINIC | Facility: CLINIC | Age: 59
End: 2024-02-19

## 2024-02-19 VITALS
SYSTOLIC BLOOD PRESSURE: 125 MMHG | WEIGHT: 176 LBS | DIASTOLIC BLOOD PRESSURE: 77 MMHG | BODY MASS INDEX: 26 KG/M2 | HEART RATE: 79 BPM

## 2024-02-19 DIAGNOSIS — Z00.00 ENCOUNTER FOR ANNUAL HEALTH EXAMINATION: Primary | ICD-10-CM

## 2024-02-19 DIAGNOSIS — E78.00 HYPERCHOLESTEROLEMIA: ICD-10-CM

## 2024-02-19 DIAGNOSIS — E11.65 TYPE 2 DIABETES MELLITUS WITH HYPERGLYCEMIA, WITHOUT LONG-TERM CURRENT USE OF INSULIN (HCC): ICD-10-CM

## 2024-02-19 DIAGNOSIS — Z12.5 ENCOUNTER FOR SCREENING FOR MALIGNANT NEOPLASM OF PROSTATE: ICD-10-CM

## 2024-02-19 DIAGNOSIS — R79.89 ELEVATED LFTS: ICD-10-CM

## 2024-02-19 DIAGNOSIS — D69.6 THROMBOCYTOPENIA (HCC): ICD-10-CM

## 2024-02-19 RX ORDER — SIMVASTATIN 40 MG
40 TABLET ORAL EVERY EVENING
Qty: 90 TABLET | Refills: 3 | Status: SHIPPED | OUTPATIENT
Start: 2024-02-19

## 2024-02-19 RX ORDER — PIOGLITAZONEHYDROCHLORIDE 30 MG/1
30 TABLET ORAL DAILY
Qty: 90 TABLET | Refills: 3 | Status: SHIPPED | OUTPATIENT
Start: 2024-02-19

## 2024-02-19 NOTE — PROGRESS NOTES
Subjective:   Mau Elizabeth is a 58 year old male who presents for a Medicare Subsequent Annual Wellness visit (Pt already had Initial Annual Wellness) and scheduled follow up of multiple significant but stable problems.       History/Other:   Fall Risk Assessment:   He has been screened for Falls and is low risk.      Cognitive Assessment:   Abnormal  What day of the week is this?: Correct  What month is it?: Correct  What year is it?: Correct  Recall \"Ball\": Correct  Recall \"Flag\": Incorrect  Recall \"Tree\": Correct    Functional Ability/Status:   Mau Elizabeth has a completely normal functional assessment. See flowsheet for details.      Depression Screening (PHQ-2/PHQ-9): PHQ-2 SCORE: 0  , done 2/19/2024             Advanced Directives:   He does NOT have a Living Will. [Do you have a living will?: No]  He does NOT have a Power of  for Health Care. [Do you have a healthcare power of ?: No]  Not discussed      Patient Active Problem List   Diagnosis    Thrombocytopenia (HCC)    Hypercholesterolemia    Type 2 diabetes mellitus with hyperglycemia (HCC)    Elevated LFTs     Allergies:  He has No Known Allergies.    Current Medications:  Outpatient Medications Marked as Taking for the 2/19/24 encounter (Office Visit) with Gaby Blankenship MD   Medication Sig    pioglitazone 30 MG Oral Tab Take 1 tablet (30 mg total) by mouth daily.    simvastatin 40 MG Oral Tab Take 1 tablet (40 mg total) by mouth every evening.       Medical History:  He  has a past medical history of Diabetes (2008) and Hypercholesterolemia.  Surgical History:  He  has a past surgical history that includes shoulder arthroscopy (3/27/13) and arthroscopy, shoulder, surgical; w/rotator cuff repair (Right, 11/19/2014).   Family History:  His family history includes Cancer (age of onset: 75) in his father; Diabetes in his mother.  Social History:  He  reports that he has quit smoking. His smoking use included cigarettes. He has never been  exposed to tobacco smoke. He has never used smokeless tobacco. He reports current alcohol use. He reports that he does not use drugs.    Tobacco:  He smoked tobacco in the past but quit greater than 12 months ago.  Social History    Tobacco Use      Smoking status: Former        Types: Cigarettes        Passive exposure: Never      Smokeless tobacco: Never       CAGE Alcohol Screen:   He has been screened for alcohol abuse and his score is not 0:  Cut: Have you ever felt you should Cut down on your drinking?: Yes  Annoyed: Have people Annoyed you by criticizing your drinking?: No  Guilty: Have you ever felt bad or Guilty about your drinking?: No  Eye Opener: Have you ever had a drink first thing in the morning to steady your nerves or to get rid of a hangover (Eye opener)?: No  Total Score: 1      Patient Care Team:  Gaby Blankenship MD as PCP - General (Family Medicine)    Review of Systems     Negative except per hpi     Objective:   Physical Exam  General appearance: alert  HEENT: Normocephalic, without obvious abnormality, atraumatic. PERRL, EOMI, pink conjunctiva.   Neck: supple, symmetrical, trachea midline, no adenopathy, no JVD and thyroid not enlarged,  Lungs: respirations unlabored, clear to auscultation bilaterally  Heart: regular rate and rhythm, S1, S2 normal, no murmur  Abdomen: normoactive bowel sounds x4; soft, non-distended; nontender; no masses  Extremities: extremities normal, atraumatic, no cyanosis or edema; no erythema or tenderness in calves bilaterally  Neurologic: alert, oriented x3    /77   Pulse 79   Wt 176 lb (79.8 kg)   BMI 25.99 kg/m²  Estimated body mass index is 25.99 kg/m² as calculated from the following:    Height as of 11/21/22: 5' 9\" (1.753 m).    Weight as of this encounter: 176 lb (79.8 kg).    Medicare Hearing Assessment:   Hearing Screening    Time taken: 2/19/2024  3:37 PM  Screening Method: Questionnaire  I have a problem hearing over the telephone: No I have  trouble following the conversations when two or more people are talking at the same time: No   I have trouble understanding things on the TV: No I have to strain to understand conversations: No   I have to worry about missing the telephone ring or doorbell: No I have trouble hearing conversations in a noisy background such as a crowded room or restaurant: No   I get confused about where sounds come from: No I misunderstand some words in a sentence and need to ask people to repeat themselves: No   I especially have trouble understanding the speech of women and children: No I have trouble understanding the speaker in a large room such as at a meeting or place of Yarsanism: No   Many people I talk to seem to mumble (or don't speak clearly): No People get annoyed because I misunderstand what they say: No   I misunderstand what others are saying and make inappropriate responses: No I avoid social activities because I cannot hear well and fear I will reply improperly: No   Family members and friends have told me they think I may have hearing loss: No               Vision testing not required for subsequent Medicare annual exam      Assessment & Plan:   Mau Elizabeth is a 58 year old male who presents for a Medicare Assessment.     1. Encounter for annual health examination (Primary)  -     CBC With Differential With Platelet  -     Comp Metabolic Panel (14)  -     Hemoglobin A1C  -     Lipid Panel  -     TSH W Reflex To Free T4  2. Type 2 diabetes mellitus with hyperglycemia, without long-term current use of insulin (HCC)  - Stable condition, continue present management.    -     Microalb/Creat Ratio, Random Urine  -     Pioglitazone HCl; Take 1 tablet (30 mg total) by mouth daily.  Dispense: 90 tablet; Refill: 3  3. Thrombocytopenia (HCC)  - Stable condition, continue present management.      4. Hypercholesterolemia  - Stable condition, continue present management.    -     Simvastatin; Take 1 tablet (40 mg total) by mouth  every evening.  Dispense: 90 tablet; Refill: 3  5. Elevated LFTs  - checking cmp     6. Encounter for screening for malignant neoplasm of prostate  -     PSA Total, Screen; Future; Expected date: 02/19/2024    The patient indicates understanding of these issues and agrees to the plan.        No follow-ups on file.     Gaby Blankenship MD, 2/19/2024     Supplementary Documentation:   General Health:  In the past six months, have you lost more than 10 pounds without trying?: 2 - No  Has your appetite been poor?: No  Type of Diet: Balanced  How does the patient maintain a good energy level?: Appropriate Exercise  How would you describe your daily physical activity?: Moderate  How would you describe your current health state?: Good  How do you maintain positive mental well-being?: Visiting Family  On a scale of 0 to 10, with 0 being no pain and 10 being severe pain, what is your pain level?: 0 - (None)  In the past six months, have you experienced urine leakage?: 0-No  At any time do you feel concerned for the safety/well-being of yourself and/or your children, in your home or elsewhere?: No  Have you had any immunizations at another office such as Influenza, Hepatitis B, Tetanus, or Pneumococcal?: No        Mau Elizabeth's SCREENING SCHEDULE   Tests on this list are recommended by your physician but may not be covered, or covered at this frequency, by your insurer.   Please check with your insurance carrier before scheduling to verify coverage.   PREVENTATIVE SERVICES FREQUENCY &  COVERAGE DETAILS LAST COMPLETION DATE   Diabetes Screening    Fasting Blood Sugar / Glucose    One screening every 12 months if never tested or if previously tested but not diagnosed with pre-diabetes   One screening every 6 months if diagnosed with pre-diabetes Lab Results   Component Value Date     (H) 02/14/2023        Cardiovascular Disease Screening    Lipid Panel  Cholesterol  Lipoprotein (HDL)  Triglycerides Covered every 5 years  for all Medicare beneficiaries without apparent signs or symptoms of cardiovascular disease Lab Results   Component Value Date    CHOLEST 168 02/14/2023    HDL 41 02/14/2023    LDL 74 02/14/2023    TRIG 333 (H) 02/14/2023         Electrocardiogram (EKG)   Covered if needed at Welcome to Medicare, and non-screening if indicated for medical reasons -      Ultrasound Screening for Abdominal Aortic Aneurysm (AAA) Covered once in a lifetime for one of the following risk factors    Men who are 65-75 years old and have ever smoked    Anyone with a family history -     Colorectal Cancer Screening  Covered for ages 50-85; only need ONE of the following:    Colonoscopy   Covered every 10 years    Covered every 2 years if patient is at high risk or previous colonoscopy was abnormal 04/05/2021    Health Maintenance   Topic Date Due    Colorectal Cancer Screening  04/05/2031       Flexible Sigmoidoscopy   Covered every 4 years -    Fecal Occult Blood Test Covered annually -   Prostate Cancer Screening    Prostate-Specific Antigen (PSA) Annually No results found for: \"PSA\"  Health Maintenance   Topic Date Due    PSA  01/18/2024      Immunizations    Influenza Covered once per flu season  Please get every year -  Influenza Vaccine(1) due on 10/01/2023    Pneumococcal Each vaccine (Svtfeta54 & Vzjlganwz07) covered once after 65 Prevnar 13: -    Erynteifl54: 05/14/2015     No recommendations at this time    Hepatitis B One screening covered for patients with certain risk factors   04/12/2019  No recommendations at this time    Tetanus Toxoid Not covered by Medicare Part B unless medically necessary (cut with metal); may be covered with your pharmacy prescription benefits -    Tetanus, Diptheria and Pertusis TD and TDaP Not covered by Medicare Part B -  No recommendations at this time    Zoster Not covered by Medicare Part B; may be covered with your pharmacy  prescription benefits -  Zoster Vaccines(1 of 2) Never done     Diabetes       Hemoglobin A1C Annually; if last result is elevated, may be asked to retest more frequently.    Medicare covers every 3 months Lab Results   Component Value Date     (H) 02/14/2023    A1C 7.2 (H) 02/14/2023       Hemoglobin A1C Test due on 08/14/2023    Creat/alb ratio Annually Lab Results   Component Value Date    MICROALBCREA 5.0 02/14/2023       LDL Annually Lab Results   Component Value Date    LDL 74 02/14/2023       Dilated Eye Exam Annually Last Diabetic Eye Exam:  Last Dilated Eye Exam: 09/18/23  Eye Exam shows Diabetic Retinopathy?: No

## 2024-04-26 ENCOUNTER — LAB ENCOUNTER (OUTPATIENT)
Dept: LAB | Age: 59
End: 2024-04-26
Attending: FAMILY MEDICINE
Payer: MEDICARE

## 2024-04-26 DIAGNOSIS — Z12.5 ENCOUNTER FOR SCREENING FOR MALIGNANT NEOPLASM OF PROSTATE: ICD-10-CM

## 2024-04-26 LAB
ALBUMIN SERPL-MCNC: 4.7 G/DL (ref 3.2–4.8)
ALBUMIN/GLOB SERPL: 1.8 {RATIO} (ref 1–2)
ALP LIVER SERPL-CCNC: 72 U/L
ALT SERPL-CCNC: 32 U/L
ANION GAP SERPL CALC-SCNC: 3 MMOL/L (ref 0–18)
AST SERPL-CCNC: 29 U/L (ref ?–34)
BASOPHILS # BLD AUTO: 0.03 X10(3) UL (ref 0–0.2)
BASOPHILS NFR BLD AUTO: 0.4 %
BILIRUB SERPL-MCNC: 0.6 MG/DL (ref 0.3–1.2)
BUN BLD-MCNC: 12 MG/DL (ref 9–23)
BUN/CREAT SERPL: 11.5 (ref 10–20)
CALCIUM BLD-MCNC: 9.9 MG/DL (ref 8.7–10.4)
CHLORIDE SERPL-SCNC: 108 MMOL/L (ref 98–112)
CHOLEST SERPL-MCNC: 170 MG/DL (ref ?–200)
CO2 SERPL-SCNC: 30 MMOL/L (ref 21–32)
COMPLEXED PSA SERPL-MCNC: 1.03 NG/ML (ref ?–4)
CREAT BLD-MCNC: 1.04 MG/DL
CREAT UR-SCNC: 134.4 MG/DL
DEPRECATED RDW RBC AUTO: 39.9 FL (ref 35.1–46.3)
EGFRCR SERPLBLD CKD-EPI 2021: 83 ML/MIN/1.73M2 (ref 60–?)
EOSINOPHIL # BLD AUTO: 0.76 X10(3) UL (ref 0–0.7)
EOSINOPHIL NFR BLD AUTO: 11.3 %
ERYTHROCYTE [DISTWIDTH] IN BLOOD BY AUTOMATED COUNT: 12.1 % (ref 11–15)
EST. AVERAGE GLUCOSE BLD GHB EST-MCNC: 148 MG/DL (ref 68–126)
FASTING PATIENT LIPID ANSWER: YES
FASTING STATUS PATIENT QL REPORTED: YES
GLOBULIN PLAS-MCNC: 2.6 G/DL (ref 2.8–4.4)
GLUCOSE BLD-MCNC: 135 MG/DL (ref 70–99)
HBA1C MFR BLD: 6.8 % (ref ?–5.7)
HCT VFR BLD AUTO: 47 %
HDLC SERPL-MCNC: 42 MG/DL (ref 40–59)
HGB BLD-MCNC: 16.2 G/DL
IMM GRANULOCYTES # BLD AUTO: 0.02 X10(3) UL (ref 0–1)
IMM GRANULOCYTES NFR BLD: 0.3 %
LDLC SERPL CALC-MCNC: 105 MG/DL (ref ?–100)
LYMPHOCYTES # BLD AUTO: 2.12 X10(3) UL (ref 1–4)
LYMPHOCYTES NFR BLD AUTO: 31.6 %
MCH RBC QN AUTO: 30.7 PG (ref 26–34)
MCHC RBC AUTO-ENTMCNC: 34.5 G/DL (ref 31–37)
MCV RBC AUTO: 89.2 FL
MICROALBUMIN UR-MCNC: <0.3 MG/DL
MONOCYTES # BLD AUTO: 0.6 X10(3) UL (ref 0.1–1)
MONOCYTES NFR BLD AUTO: 8.9 %
NEUTROPHILS # BLD AUTO: 3.18 X10 (3) UL (ref 1.5–7.7)
NEUTROPHILS # BLD AUTO: 3.18 X10(3) UL (ref 1.5–7.7)
NEUTROPHILS NFR BLD AUTO: 47.5 %
NONHDLC SERPL-MCNC: 128 MG/DL (ref ?–130)
OSMOLALITY SERPL CALC.SUM OF ELEC: 294 MOSM/KG (ref 275–295)
PLATELET # BLD AUTO: 146 10(3)UL (ref 150–450)
POTASSIUM SERPL-SCNC: 4.3 MMOL/L (ref 3.5–5.1)
PROT SERPL-MCNC: 7.3 G/DL (ref 5.7–8.2)
RBC # BLD AUTO: 5.27 X10(6)UL
SODIUM SERPL-SCNC: 141 MMOL/L (ref 136–145)
TRIGL SERPL-MCNC: 127 MG/DL (ref 30–149)
TSI SER-ACNC: 3.78 MIU/ML (ref 0.55–4.78)
VLDLC SERPL CALC-MCNC: 21 MG/DL (ref 0–30)
WBC # BLD AUTO: 6.7 X10(3) UL (ref 4–11)

## 2024-04-26 PROCEDURE — 82043 UR ALBUMIN QUANTITATIVE: CPT | Performed by: FAMILY MEDICINE

## 2024-04-26 PROCEDURE — 80053 COMPREHEN METABOLIC PANEL: CPT | Performed by: FAMILY MEDICINE

## 2024-04-26 PROCEDURE — 36415 COLL VENOUS BLD VENIPUNCTURE: CPT

## 2024-04-26 PROCEDURE — 80061 LIPID PANEL: CPT | Performed by: FAMILY MEDICINE

## 2024-04-26 PROCEDURE — 84443 ASSAY THYROID STIM HORMONE: CPT | Performed by: FAMILY MEDICINE

## 2024-04-26 PROCEDURE — 83036 HEMOGLOBIN GLYCOSYLATED A1C: CPT | Performed by: FAMILY MEDICINE

## 2024-04-26 PROCEDURE — 82570 ASSAY OF URINE CREATININE: CPT | Performed by: FAMILY MEDICINE

## 2024-04-26 PROCEDURE — 85025 COMPLETE CBC W/AUTO DIFF WBC: CPT | Performed by: FAMILY MEDICINE

## 2024-05-02 ENCOUNTER — TELEPHONE (OUTPATIENT)
Dept: FAMILY MEDICINE CLINIC | Facility: CLINIC | Age: 59
End: 2024-05-02

## 2024-05-13 ENCOUNTER — PATIENT OUTREACH (OUTPATIENT)
Dept: CASE MANAGEMENT | Age: 59
End: 2024-05-13

## 2024-05-13 NOTE — PROCEDURES
Received order for \"Diabetes care microalbumin test is up-to-date but still showing up as a care gap\".  Diabetes Care Gap: Microalb/Creat Ratio has been care gap has been completed by EPIC. Finalized order on May 13, 2024.    Thanks,  [facility] Team

## 2025-02-20 ENCOUNTER — OFFICE VISIT (OUTPATIENT)
Dept: FAMILY MEDICINE CLINIC | Facility: CLINIC | Age: 60
End: 2025-02-20

## 2025-02-20 VITALS
HEIGHT: 69 IN | SYSTOLIC BLOOD PRESSURE: 118 MMHG | DIASTOLIC BLOOD PRESSURE: 78 MMHG | BODY MASS INDEX: 26.48 KG/M2 | HEART RATE: 75 BPM | WEIGHT: 178.81 LBS

## 2025-02-20 DIAGNOSIS — Z23 ENCOUNTER FOR ADMINISTRATION OF VACCINE: ICD-10-CM

## 2025-02-20 DIAGNOSIS — Z13.6 SCREENING FOR CARDIOVASCULAR CONDITION: ICD-10-CM

## 2025-02-20 DIAGNOSIS — E78.00 HYPERCHOLESTEROLEMIA: ICD-10-CM

## 2025-02-20 DIAGNOSIS — D69.6 THROMBOCYTOPENIA: ICD-10-CM

## 2025-02-20 DIAGNOSIS — Z00.00 ENCOUNTER FOR ANNUAL HEALTH EXAMINATION: Primary | ICD-10-CM

## 2025-02-20 DIAGNOSIS — E11.65 TYPE 2 DIABETES MELLITUS WITH HYPERGLYCEMIA, WITHOUT LONG-TERM CURRENT USE OF INSULIN (HCC): ICD-10-CM

## 2025-02-20 DIAGNOSIS — Z13.1 SCREENING FOR DIABETES MELLITUS (DM): ICD-10-CM

## 2025-02-20 PROBLEM — R79.89 ELEVATED LFTS: Status: RESOLVED | Noted: 2022-08-30 | Resolved: 2025-02-20

## 2025-02-20 RX ORDER — SIMVASTATIN 40 MG
40 TABLET ORAL EVERY EVENING
Qty: 90 TABLET | Refills: 3 | Status: SHIPPED | OUTPATIENT
Start: 2025-02-20

## 2025-02-20 RX ORDER — PIOGLITAZONE 30 MG/1
30 TABLET ORAL DAILY
Qty: 90 TABLET | Refills: 3 | Status: SHIPPED | OUTPATIENT
Start: 2025-02-20

## 2025-02-20 NOTE — PROGRESS NOTES
Subjective:   Mau Elizabeth is a 59 year old male who presents for a MA AHA (Medicare Advantage Annual Health Assessment) and Subsequent Annual Wellness visit (Pt already had Initial Annual Wellness) and scheduled follow up of multiple significant but stable problems.       History/Other:   Fall Risk Assessment:   He has been screened for Falls and is low risk.      Cognitive Assessment:   He had a completely normal cognitive assessment - see flowsheet entries     Functional Ability/Status:   Mau Elizabeth has some abnormal functions as listed below:  He has Vision problems based on screening of functional status.       Depression Screening (PHQ):  PHQ-2 SCORE: 0  , done 2/20/2025             Advanced Directives:   He does NOT have a Living Will. [Do you have a living will?: No]  He does NOT have a Power of  for Health Care. [Do you have a healthcare power of ?: No]  Not discussed      Patient Active Problem List   Diagnosis    Thrombocytopenia    Hypercholesterolemia    Type 2 diabetes mellitus with hyperglycemia (HCC)     Allergies:  He has No Known Allergies.    Current Medications:  Outpatient Medications Marked as Taking for the 2/20/25 encounter (Office Visit) with Nora Faye APRN   Medication Sig    simvastatin 40 MG Oral Tab Take 1 tablet (40 mg total) by mouth every evening.    pioglitazone 30 MG Oral Tab Take 1 tablet (30 mg total) by mouth daily.       Medical History:  He  has a past medical history of Diabetes (2008) and Hypercholesterolemia.  Surgical History:  He  has a past surgical history that includes shoulder arthroscopy (3/27/13) and arthroscopy, shoulder, surgical; w/rotator cuff repair (Right, 11/19/2014).   Family History:  His family history includes Cancer (age of onset: 75) in his father; Diabetes in his mother.  Social History:  He  reports that he has quit smoking. His smoking use included cigarettes. He has never been exposed to tobacco smoke. He has never used smokeless  tobacco. He reports current alcohol use. He reports that he does not use drugs.    Tobacco:  He smoked tobacco in the past but quit greater than 12 months ago.  Social History     Tobacco Use   Smoking Status Former    Types: Cigarettes    Passive exposure: Never   Smokeless Tobacco Never        CAGE Alcohol Screen:   CAGE screening score of 0 on 2/20/2025, showing low risk of alcohol abuse.      Patient Care Team:  Gaby Blankenship MD as PCP - General (Family Medicine)    Review of Systems   All other systems reviewed and are negative.         Objective:   Physical Exam  Vitals and nursing note reviewed.   Constitutional:       General: He is not in acute distress.     Appearance: Normal appearance. He is well-developed. He is not ill-appearing, toxic-appearing or diaphoretic.   HENT:      Head: Normocephalic and atraumatic.      Right Ear: Hearing, tympanic membrane, ear canal and external ear normal. There is no impacted cerumen.      Left Ear: Hearing, tympanic membrane, ear canal and external ear normal. There is no impacted cerumen.      Nose: Nose normal. No congestion or rhinorrhea.      Mouth/Throat:      Mouth: Mucous membranes are moist.      Pharynx: Oropharynx is clear. No oropharyngeal exudate or posterior oropharyngeal erythema.   Eyes:      General:         Right eye: No discharge.         Left eye: No discharge.      Conjunctiva/sclera: Conjunctivae normal.   Neck:      Thyroid: No thyromegaly.   Cardiovascular:      Rate and Rhythm: Normal rate and regular rhythm.      Pulses: Normal pulses.      Heart sounds: Normal heart sounds. No murmur heard.  Pulmonary:      Effort: Pulmonary effort is normal. No respiratory distress.      Breath sounds: Normal breath sounds. No stridor. No wheezing, rhonchi or rales.   Chest:      Chest wall: No tenderness.   Abdominal:      General: Abdomen is flat. Bowel sounds are normal. There is no distension.      Palpations: Abdomen is soft. There is no mass.       Tenderness: There is no abdominal tenderness. There is no guarding or rebound.      Hernia: No hernia is present.   Musculoskeletal:         General: Normal range of motion.      Cervical back: Normal range of motion and neck supple.   Feet:      Comments: Bilateral barefoot skin diabetic exam is normal, visualized feet and the appearance is normal.  Bilateral monofilament/sensation of both feet is normal.  Pulsation pedal pulse exam of both lower legs/feet is normal as well.        Lymphadenopathy:      Cervical: No cervical adenopathy.   Skin:     General: Skin is warm and dry.   Neurological:      Mental Status: He is alert and oriented to person, place, and time.   Psychiatric:         Mood and Affect: Mood normal.         Behavior: Behavior normal.         Thought Content: Thought content normal.         Judgment: Judgment normal.          /78 (BP Location: Left arm, Patient Position: Sitting, Cuff Size: large)   Pulse 75   Ht 5' 9\" (1.753 m)   Wt 178 lb 12.8 oz (81.1 kg)   BMI 26.40 kg/m²  Estimated body mass index is 26.4 kg/m² as calculated from the following:    Height as of this encounter: 5' 9\" (1.753 m).    Weight as of this encounter: 178 lb 12.8 oz (81.1 kg).    Medicare Hearing Assessment:   Hearing Screening    Screening Method: Questionnaire  I have a problem hearing over the telephone: No I have trouble following the conversations when two or more people are talking at the same time: No   I have trouble understanding things on the TV: No I have to strain to understand conversations: No   I have to worry about missing the telephone ring or doorbell: No I have trouble hearing conversations in a noisy background such as a crowded room or restaurant: Sometimes   I get confused about where sounds come from: No I misunderstand some words in a sentence and need to ask people to repeat themselves: No   I especially have trouble understanding the speech of women and children: No I have trouble  understanding the speaker in a large room such as at a meeting or place of Restorationism: No   Many people I talk to seem to mumble (or don't speak clearly): No People get annoyed because I misunderstand what they say: Sometimes   I misunderstand what others are saying and make inappropriate responses: No I avoid social activities because I cannot hear well and fear I will reply improperly: No   Family members and friends have told me they think I may have hearing loss: No             Visual Acuity:   Right Eye Visual Acuity: Uncorrected Right Eye Chart Acuity: 20/30   Left Eye Visual Acuity: Uncorrected Left Eye Chart Acuity: 20/30   Both Eyes Visual Acuity: Uncorrected Both Eyes Chart Acuity: 20/25   Able To Tolerate Visual Acuity: Yes        Assessment & Plan:   Mau Elizabeth is a 59 year old male who presents for a Medicare Assessment.     1. Encounter for annual health examination (Primary)  -     Lipid Panel  -     Comp Metabolic Panel (14)  -     Hemoglobin A1C  -     TSH W Reflex To Free T4  -     CBC With Differential With Platelet  -     Microalb/Creat Ratio, Random Urine  -     Fluzone trivalent vaccine, PF 0.5mL, 6mo+ (88873)  2. Hypercholesterolemia  -     Simvastatin; Take 1 tablet (40 mg total) by mouth every evening.  Dispense: 90 tablet; Refill: 3  -     Lipid Panel  Refills of medication to pharmacy on file.  3. Thrombocytopenia  Stable condition, continue present management, recheck CBC.  4. Type 2 diabetes mellitus with hyperglycemia, without long-term current use of insulin (HCC)  -     Pioglitazone HCl; Take 1 tablet (30 mg total) by mouth daily.  Dispense: 90 tablet; Refill: 3  -     Hemoglobin A1C  -     Microalb/Creat Ratio, Random Urine  -     Ophthalmology Referral - In Network  Refill of medication to pharmacy on file, encourage patient to continue with healthy diet and regular exercise efforts.  5. Screening for diabetes mellitus (DM)  -     Hemoglobin A1C  6. Screening for cardiovascular  condition  -     Lipid Panel  7. Encounter for administration of vaccine  -     Fluzone trivalent vaccine, PF 0.5mL, 6mo+ (36912)    The patient indicates understanding of these issues and agrees to the plan.  Lab work ordered.  Prescription medication ordered.  Reinforced healthy diet, lifestyle, and exercise.      Return in 6 months (on 8/20/2025).     Nora FayePAGE, 2/20/2025     Supplementary Documentation:   General Health:  In the past six months, have you lost more than 10 pounds without trying?: 2 - No  Has your appetite been poor?: No  Type of Diet: Diabetic  How does the patient maintain a good energy level?: Daily Walks  How would you describe your daily physical activity?: Moderate  How would you describe your current health state?: Fair  How do you maintain positive mental well-being?: Visiting Family  On a scale of 0 to 10, with 0 being no pain and 10 being severe pain, what is your pain level?: 0 - (None)  In the past six months, have you experienced urine leakage?: 0-No  At any time do you feel concerned for the safety/well-being of yourself and/or your children, in your home or elsewhere?: No  Have you had any immunizations at another office such as Influenza, Hepatitis B, Tetanus, or Pneumococcal?: No    Health Maintenance   Topic Date Due    Zoster Vaccines (1 of 2) Never done    COVID-19 Vaccine (4 - 2024-25 season) 09/01/2024    Diabetes Care Dilated Eye Exam  09/18/2024    Influenza Vaccine (1) 10/01/2024    Diabetes Care A1C  10/26/2024    Annual Well Visit  01/01/2025    Diabetes Care: Foot Exam (Annual)  Never done    Diabetes Care: Microalb/Creat Ratio (Annual)  01/01/2025    Diabetes Care: GFR  04/26/2025    PSA  04/26/2026    DTaP,Tdap,and Td Vaccines (2 - Td or Tdap) 03/31/2027    Colorectal Cancer Screening  04/05/2031    Annual Depression Screening  Completed    Pneumococcal Vaccine: 50+ Years  Completed    Meningococcal B Vaccine  Aged Out

## 2025-02-22 ENCOUNTER — LAB ENCOUNTER (OUTPATIENT)
Dept: LAB | Age: 60
End: 2025-02-22
Attending: NURSE PRACTITIONER
Payer: MEDICARE

## 2025-02-22 LAB
ALBUMIN SERPL-MCNC: 4.6 G/DL (ref 3.2–4.8)
ALBUMIN/GLOB SERPL: 1.7 {RATIO} (ref 1–2)
ALP LIVER SERPL-CCNC: 73 U/L
ALT SERPL-CCNC: 72 U/L
ANION GAP SERPL CALC-SCNC: 5 MMOL/L (ref 0–18)
AST SERPL-CCNC: 41 U/L (ref ?–34)
BASOPHILS # BLD AUTO: 0.06 X10(3) UL (ref 0–0.2)
BASOPHILS NFR BLD AUTO: 0.9 %
BILIRUB SERPL-MCNC: 0.8 MG/DL (ref 0.2–1.1)
BUN BLD-MCNC: 15 MG/DL (ref 9–23)
BUN/CREAT SERPL: 14.3 (ref 10–20)
CALCIUM BLD-MCNC: 9.3 MG/DL (ref 8.7–10.4)
CHLORIDE SERPL-SCNC: 105 MMOL/L (ref 98–112)
CHOLEST SERPL-MCNC: 243 MG/DL (ref ?–200)
CO2 SERPL-SCNC: 28 MMOL/L (ref 21–32)
CREAT BLD-MCNC: 1.05 MG/DL
CREAT UR-SCNC: 156.3 MG/DL
DEPRECATED RDW RBC AUTO: 38.6 FL (ref 35.1–46.3)
EGFRCR SERPLBLD CKD-EPI 2021: 81 ML/MIN/1.73M2 (ref 60–?)
EOSINOPHIL # BLD AUTO: 0.92 X10(3) UL (ref 0–0.7)
EOSINOPHIL NFR BLD AUTO: 14.2 %
ERYTHROCYTE [DISTWIDTH] IN BLOOD BY AUTOMATED COUNT: 11.8 % (ref 11–15)
EST. AVERAGE GLUCOSE BLD GHB EST-MCNC: 189 MG/DL (ref 68–126)
FASTING PATIENT LIPID ANSWER: YES
FASTING STATUS PATIENT QL REPORTED: YES
GLOBULIN PLAS-MCNC: 2.7 G/DL (ref 2–3.5)
GLUCOSE BLD-MCNC: 158 MG/DL (ref 70–99)
HBA1C MFR BLD: 8.2 % (ref ?–5.7)
HCT VFR BLD AUTO: 47.5 %
HDLC SERPL-MCNC: 39 MG/DL (ref 40–59)
HGB BLD-MCNC: 16.2 G/DL
IMM GRANULOCYTES # BLD AUTO: 0.01 X10(3) UL (ref 0–1)
IMM GRANULOCYTES NFR BLD: 0.2 %
LDLC SERPL CALC-MCNC: 176 MG/DL (ref ?–100)
LYMPHOCYTES # BLD AUTO: 2.23 X10(3) UL (ref 1–4)
LYMPHOCYTES NFR BLD AUTO: 34.4 %
MCH RBC QN AUTO: 30.6 PG (ref 26–34)
MCHC RBC AUTO-ENTMCNC: 34.1 G/DL (ref 31–37)
MCV RBC AUTO: 89.8 FL
MICROALBUMIN UR-MCNC: <0.3 MG/DL
MONOCYTES # BLD AUTO: 0.63 X10(3) UL (ref 0.1–1)
MONOCYTES NFR BLD AUTO: 9.7 %
NEUTROPHILS # BLD AUTO: 2.63 X10 (3) UL (ref 1.5–7.7)
NEUTROPHILS # BLD AUTO: 2.63 X10(3) UL (ref 1.5–7.7)
NEUTROPHILS NFR BLD AUTO: 40.6 %
NONHDLC SERPL-MCNC: 204 MG/DL (ref ?–130)
OSMOLALITY SERPL CALC.SUM OF ELEC: 290 MOSM/KG (ref 275–295)
PLATELET # BLD AUTO: 171 10(3)UL (ref 150–450)
POTASSIUM SERPL-SCNC: 3.9 MMOL/L (ref 3.5–5.1)
PROT SERPL-MCNC: 7.3 G/DL (ref 5.7–8.2)
RBC # BLD AUTO: 5.29 X10(6)UL
SODIUM SERPL-SCNC: 138 MMOL/L (ref 136–145)
TRIGL SERPL-MCNC: 153 MG/DL (ref 30–149)
TSI SER-ACNC: 3.38 UIU/ML (ref 0.55–4.78)
VLDLC SERPL CALC-MCNC: 31 MG/DL (ref 0–30)
WBC # BLD AUTO: 6.5 X10(3) UL (ref 4–11)

## 2025-02-22 PROCEDURE — 83036 HEMOGLOBIN GLYCOSYLATED A1C: CPT | Performed by: NURSE PRACTITIONER

## 2025-02-22 PROCEDURE — 80061 LIPID PANEL: CPT | Performed by: NURSE PRACTITIONER

## 2025-02-22 PROCEDURE — 82570 ASSAY OF URINE CREATININE: CPT | Performed by: NURSE PRACTITIONER

## 2025-02-22 PROCEDURE — 85025 COMPLETE CBC W/AUTO DIFF WBC: CPT | Performed by: NURSE PRACTITIONER

## 2025-02-22 PROCEDURE — 36415 COLL VENOUS BLD VENIPUNCTURE: CPT | Performed by: NURSE PRACTITIONER

## 2025-02-22 PROCEDURE — 84443 ASSAY THYROID STIM HORMONE: CPT | Performed by: NURSE PRACTITIONER

## 2025-02-22 PROCEDURE — 82043 UR ALBUMIN QUANTITATIVE: CPT | Performed by: NURSE PRACTITIONER

## 2025-02-22 PROCEDURE — 80053 COMPREHEN METABOLIC PANEL: CPT | Performed by: NURSE PRACTITIONER

## 2025-03-04 ENCOUNTER — OFFICE VISIT (OUTPATIENT)
Dept: FAMILY MEDICINE CLINIC | Facility: CLINIC | Age: 60
End: 2025-03-04

## 2025-03-04 VITALS
HEART RATE: 62 BPM | WEIGHT: 175 LBS | DIASTOLIC BLOOD PRESSURE: 62 MMHG | BODY MASS INDEX: 25.92 KG/M2 | HEIGHT: 69 IN | SYSTOLIC BLOOD PRESSURE: 104 MMHG

## 2025-03-04 DIAGNOSIS — E11.65 TYPE 2 DIABETES MELLITUS WITH HYPERGLYCEMIA, WITHOUT LONG-TERM CURRENT USE OF INSULIN (HCC): Primary | ICD-10-CM

## 2025-03-04 DIAGNOSIS — E78.00 HYPERCHOLESTEROLEMIA: ICD-10-CM

## 2025-03-04 PROCEDURE — 3078F DIAST BP <80 MM HG: CPT | Performed by: NURSE PRACTITIONER

## 2025-03-04 PROCEDURE — 3052F HG A1C>EQUAL 8.0%<EQUAL 9.0%: CPT | Performed by: NURSE PRACTITIONER

## 2025-03-04 PROCEDURE — 99213 OFFICE O/P EST LOW 20 MIN: CPT | Performed by: NURSE PRACTITIONER

## 2025-03-04 PROCEDURE — 3008F BODY MASS INDEX DOCD: CPT | Performed by: NURSE PRACTITIONER

## 2025-03-04 PROCEDURE — 3074F SYST BP LT 130 MM HG: CPT | Performed by: NURSE PRACTITIONER

## 2025-03-04 PROCEDURE — 3061F NEG MICROALBUMINURIA REV: CPT | Performed by: NURSE PRACTITIONER

## 2025-03-04 RX ORDER — GLIMEPIRIDE 2 MG/1
2 TABLET ORAL
Qty: 90 TABLET | Refills: 1 | Status: SHIPPED | OUTPATIENT
Start: 2025-03-04

## 2025-03-05 NOTE — PROGRESS NOTES
HPI    Patient presents to discuss recent lab results.  With hemoglobin A1c of 8.2.      Review of Systems   All other systems reviewed and are negative.       Vitals:    03/04/25 1815   BP: 104/62   Pulse: 62   Weight: 175 lb (79.4 kg)   Height: 5' 9\" (1.753 m)     Body mass index is 25.84 kg/m².    Health Maintenance   Topic Date Due    Zoster Vaccines (1 of 2) Never done    COVID-19 Vaccine (4 - 2024-25 season) 09/01/2024    Diabetes Care Dilated Eye Exam  09/18/2024    Diabetes Care A1C  05/22/2025    Diabetes Care: GFR  02/22/2026    PSA  04/26/2026    DTaP,Tdap,and Td Vaccines (2 - Td or Tdap) 03/31/2027    Colorectal Cancer Screening  04/05/2031    Influenza Vaccine  Completed    Annual Well Visit  Completed    Annual Depression Screening  Completed    Diabetes Care: Foot Exam (Annual)  Completed    Diabetes Care: Microalb/Creat Ratio (Annual)  Completed    Pneumococcal Vaccine: 50+ Years  Completed    Meningococcal B Vaccine  Aged Out       Past Medical History:    Diabetes    Hypercholesterolemia       .  Past Surgical History:   Procedure Laterality Date    Arthroscopy, shoulder, surgical; w/rotator cuff repair Right 11/19/2014    Procedure: ARTHROSCOPY SHOULDER WITH ROTATOR CUFF REPAIR;  Surgeon: Bhanu Coy MD;  Location: [facility]    Shoulder arthroscopy  3/27/13    Dr Coy       Family History   Problem Relation Age of Onset    Cancer Father 75        Renal     Diabetes Mother        Social History     Socioeconomic History    Marital status:      Spouse name: Not on file    Number of children: Not on file    Years of education: Not on file    Highest education level: Not on file   Occupational History    Occupation: Retired, used to work in factory    Tobacco Use    Smoking status: Former     Types: Cigarettes     Passive exposure: Never    Smokeless tobacco: Never   Vaping Use    Vaping status: Never Used   Substance and Sexual Activity    Alcohol use: Yes     Alcohol/week:  0.0 standard drinks of alcohol     Types: 2 - 3 Cans of beer per week    Drug use: No    Sexual activity: Yes     Partners: Female   Other Topics Concern    Not on file   Social History Narrative    Lives with kids and wife. Kids ages 27, 25 and 14.      Social Drivers of Health     Food Insecurity: Not on file   Transportation Needs: Not on file   Stress: Not on file   Housing Stability: Not on file       Current Outpatient Medications   Medication Sig Dispense Refill    glimepiride 2 MG Oral Tab Take 1 tablet (2 mg total) by mouth daily with breakfast. 90 tablet 1    simvastatin 40 MG Oral Tab Take 1 tablet (40 mg total) by mouth every evening. 90 tablet 3    pioglitazone 30 MG Oral Tab Take 1 tablet (30 mg total) by mouth daily. 90 tablet 3       Allergies:  Allergies[1]    Physical Exam  Vitals and nursing note reviewed.   Constitutional:       General: He is not in acute distress.     Appearance: Normal appearance.   Cardiovascular:      Rate and Rhythm: Normal rate and regular rhythm.      Heart sounds: Normal heart sounds.   Pulmonary:      Effort: Pulmonary effort is normal. No respiratory distress.      Breath sounds: Normal breath sounds. No stridor. No wheezing, rhonchi or rales.   Chest:      Chest wall: No tenderness.   Neurological:      Mental Status: He is alert and oriented to person, place, and time.          Assessment and Plan:   Problem List Items Addressed This Visit          HCC Problems    Type 2 diabetes mellitus with hyperglycemia (HCC) - Primary    Relevant Medications    glimepiride 2 MG Oral Tab       Cardiac and Vasculature    Hypercholesterolemia    Relevant Medications    glimepiride 2 MG Oral Tab      Add glimepiride 2 mg once daily.  Follow-up for point-of-care hemoglobin A1c in 3 months.  Continue with healthy diet and regular exercise.    Discussed plan of care with patient and patient is in agreement.  All questions answered. Patient to call with questions or  concerns.    Encouraged to sign up for My Chart if not already registered.        [1] No Known Allergies

## 2025-05-14 ENCOUNTER — TELEPHONE (OUTPATIENT)
Dept: FAMILY MEDICINE CLINIC | Facility: CLINIC | Age: 60
End: 2025-05-14

## 2025-05-19 ENCOUNTER — MED REC SCAN ONLY (OUTPATIENT)
Dept: FAMILY MEDICINE CLINIC | Facility: CLINIC | Age: 60
End: 2025-05-19

## 2025-05-20 ENCOUNTER — MED REC SCAN ONLY (OUTPATIENT)
Dept: FAMILY MEDICINE CLINIC | Facility: CLINIC | Age: 60
End: 2025-05-20

## 2025-07-16 ENCOUNTER — TELEPHONE (OUTPATIENT)
Dept: FAMILY MEDICINE CLINIC | Facility: CLINIC | Age: 60
End: 2025-07-16